# Patient Record
Sex: FEMALE | Race: WHITE | HISPANIC OR LATINO | Employment: STUDENT | URBAN - METROPOLITAN AREA
[De-identification: names, ages, dates, MRNs, and addresses within clinical notes are randomized per-mention and may not be internally consistent; named-entity substitution may affect disease eponyms.]

---
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
## 2023-01-01 NOTE — LETTER
2022    To Gail Kurtz YOB: 2005      This letter is to advise you that your recent CULTURE for gonorrhea and chlamydia results were reviewed by me and are NORMAL  Please contact the office for an appointment if you have any additional concerns      YASMANI Moon Statement Selected

## 2023-01-30 ENCOUNTER — TELEPHONE (OUTPATIENT)
Dept: OBGYN CLINIC | Facility: CLINIC | Age: 18
End: 2023-01-30

## 2023-01-30 NOTE — TELEPHONE ENCOUNTER
Patient called in reporting yellowish vaginal discharge and malodorous urine  I advised her to make an appointment to come in for evaluation

## 2023-02-11 ENCOUNTER — OFFICE VISIT (OUTPATIENT)
Dept: OBGYN CLINIC | Facility: CLINIC | Age: 18
End: 2023-02-11

## 2023-02-11 VITALS
DIASTOLIC BLOOD PRESSURE: 62 MMHG | WEIGHT: 145.2 LBS | HEIGHT: 64 IN | BODY MASS INDEX: 24.79 KG/M2 | HEART RATE: 91 BPM | SYSTOLIC BLOOD PRESSURE: 115 MMHG

## 2023-02-11 DIAGNOSIS — N89.8 VAGINAL DISCHARGE: Primary | ICD-10-CM

## 2023-02-11 DIAGNOSIS — N89.8 VAGINAL ODOR: ICD-10-CM

## 2023-02-11 DIAGNOSIS — B96.89 BV (BACTERIAL VAGINOSIS): ICD-10-CM

## 2023-02-11 DIAGNOSIS — N76.0 BV (BACTERIAL VAGINOSIS): ICD-10-CM

## 2023-02-11 DIAGNOSIS — Z13.31 POSITIVE DEPRESSION SCREENING: ICD-10-CM

## 2023-02-11 RX ORDER — METRONIDAZOLE 500 MG/1
500 TABLET ORAL EVERY 12 HOURS SCHEDULED
Qty: 14 TABLET | Refills: 0 | Status: SHIPPED | OUTPATIENT
Start: 2023-02-11 | End: 2023-02-18

## 2023-02-11 NOTE — LETTER
2023    To Quan Fisher  : 2005      This letter is to advise you that your recent CULTURES for gonorrhea and chlamydia were reviewed by me and are NORMAL  Please contact the office for an appointment if you have any additional concerns      [id number]

## 2023-02-11 NOTE — PATIENT INSTRUCTIONS
Thank you for your confidence in our team    We appreciate you and welcome your feedback  If you receive a survey from us, please take a few moments to let us know how we are doing     Sincerely,  Elaine Alu

## 2023-02-11 NOTE — PROGRESS NOTES
PROBLEM GYNECOLOGICAL VISIT    Jina Delgado is a 21 y o  female who presents today with complaint of vaginal discharge  Her general medical history has been reviewed and she reports it as follows:    Past Medical History:   Diagnosis Date   • Depression     no meds since 2021     Past Surgical History:   Procedure Laterality Date   • NO PAST SURGERIES       OB History        0    Para   0    Term   0       0    AB   0    Living   0       SAB   0    IAB   0    Ectopic   0    Multiple   0    Live Births   0               Social History     Tobacco Use   • Smoking status: Never   • Smokeless tobacco: Never   Vaping Use   • Vaping Use: Never used   Substance Use Topics   • Alcohol use: Never   • Drug use: Never     Social History     Substance and Sexual Activity   Sexual Activity Yes   • Partners: Male   • Birth control/protection: Condom Male, Patch       Current Outpatient Medications   Medication Instructions   • norelgestromin-ethinyl estradiol (ORTHO EVRA) 150-35 MCG/24HR 1 patch, Transdermal, Weekly       History of Present Illness:   Reports for the past 2 weeks she has been experiencing yellowish/whitish vaginal discharge and odor  Denies vaginal itching/irritation or pelvic pain  She is sexually active and does not use condoms  Review of Systems:  Review of Systems   Constitutional: Negative  Gastrointestinal: Negative  Genitourinary: Positive for vaginal discharge  Negative for dysuria, menstrual problem, pelvic pain and vaginal pain  Vaginal odor       Physical Exam:  /62 (BP Location: Right arm, Patient Position: Sitting, Cuff Size: Adult)   Pulse 91   Ht 5' 4" (1 626 m)   Wt 65 9 kg (145 lb 3 2 oz)   LMP 2023 (Exact Date)   BMI 24 92 kg/m²   Physical Exam  Constitutional:       General: She is not in acute distress  Genitourinary:      Vulva exam comments: Normal       Vaginal discharge present  No vaginal erythema     Neurological:      Mental Status: She is alert  Skin:     General: Skin is warm and dry  Vitals reviewed  Point of Care Testing:   -Wet mount: + clue cells, no trichomonads, few WBC's, pH=4 0   -KOH mount: no hyphae   -Whiff: negative    Assessment:   1  BV  Plan:   1  Given Rx Flagyl  2  GC/CT cultures ordered and collected  3  Return to office prn    4  Patient's depression screening was assessed with a PHQ-2 score of 3  Their PHQ-9 score was 10  Patient assessed for underlying major depression  They have no active suicidal ideations  Brief counseling provided and recommend additional follow-up/re-evaluation next office visit  Referral placed for  consultation

## 2023-02-13 LAB
C TRACH DNA SPEC QL NAA+PROBE: NEGATIVE
N GONORRHOEA DNA SPEC QL NAA+PROBE: NEGATIVE

## 2023-03-06 ENCOUNTER — OFFICE VISIT (OUTPATIENT)
Dept: OBGYN CLINIC | Facility: CLINIC | Age: 18
End: 2023-03-06

## 2023-03-06 VITALS
HEART RATE: 92 BPM | DIASTOLIC BLOOD PRESSURE: 70 MMHG | BODY MASS INDEX: 25.16 KG/M2 | WEIGHT: 146.6 LBS | SYSTOLIC BLOOD PRESSURE: 116 MMHG

## 2023-03-06 DIAGNOSIS — B37.31 VAGINAL CANDIDIASIS: Primary | ICD-10-CM

## 2023-03-06 DIAGNOSIS — N89.8 VAGINA ITCHING: ICD-10-CM

## 2023-03-06 DIAGNOSIS — N89.8 VAGINAL DISCHARGE: ICD-10-CM

## 2023-03-06 RX ORDER — FLUCONAZOLE 150 MG/1
150 TABLET ORAL
Qty: 2 TABLET | Refills: 0 | Status: SHIPPED | OUTPATIENT
Start: 2023-03-06 | End: 2023-03-10

## 2023-03-06 NOTE — PATIENT INSTRUCTIONS
Thank you for your confidence in our team    We appreciate you and welcome your feedback  If you receive a survey from us, please take a few moments to let us know how we are doing     Sincerely,  Ciera Leahy

## 2023-03-06 NOTE — PROGRESS NOTES
PROBLEM GYNECOLOGICAL VISIT    Elías Anderson is a 21 y o  female who presents today with complaint of vaginal discharge and itching/burning  Her general medical history has been reviewed and she reports it as follows:    Past Medical History:   Diagnosis Date   • Depression     no meds since 2021     Past Surgical History:   Procedure Laterality Date   • NO PAST SURGERIES       OB History        0    Para   0    Term   0       0    AB   0    Living   0       SAB   0    IAB   0    Ectopic   0    Multiple   0    Live Births   0               Social History     Tobacco Use   • Smoking status: Never   • Smokeless tobacco: Never   Vaping Use   • Vaping Use: Never used   Substance Use Topics   • Alcohol use: Never   • Drug use: Never     Social History     Substance and Sexual Activity   Sexual Activity Yes   • Partners: Male   • Birth control/protection: Condom Male, Patch       Current Outpatient Medications   Medication Instructions   • norelgestromin-ethinyl estradiol (ORTHO EVRA) 150-35 MCG/24HR 1 patch, Transdermal, Weekly       History of Present Illness:   Patient was seen here on 2023 for c/o vaginal discharge and odor  She was diagnosed with BV and GC/CT screening was negative  She reports that she completed full course of Flagyl as prescribed  However, she states that since then she has been experiencing thicker white vaginal discharge and itching/burning of vagina and vulva with open "scratches"  Review of Systems:  Review of Systems   Constitutional: Negative  Gastrointestinal: Negative  Genitourinary: Positive for vaginal discharge and vaginal pain  Physical Exam:  /70   Pulse 92   Wt 66 5 kg (146 lb 9 6 oz)   LMP 2023 (Exact Date)   BMI 25 16 kg/m²   Physical Exam  Constitutional:       General: She is not in acute distress  Genitourinary:      Vulva exam comments: + erythema and excoriation of bilateral labia minora and majora        Vaginal discharge and erythema present  Abdominal:      Palpations: Abdomen is soft  Tenderness: There is no abdominal tenderness  Neurological:      Mental Status: She is alert  Skin:     General: Skin is warm and dry  Vitals reviewed  Point of Care Testing:   -Wet mount: no clue cells, no trichomonads, few WBC's, pH=4 0   -KOH mount: + hyphae   -Whiff: negative    Assessment:   1  Vaginal candidiasis  Plan:   1  Given Rx Diflucan x2 doses  2  Return to office prn    3  Patient's depression screening was assessed with a PHQ-2 score of 2  Their PHQ-9 score was 8  Clinically patient does not have depression  No treatment is required  Reviewed with patient that test results are available in MyChart immediately, but that they will not necessarily be reviewed by me immediately  Explained that I will review results at my earliest opportunity and contact patient appropriately

## 2023-03-07 ENCOUNTER — PATIENT OUTREACH (OUTPATIENT)
Dept: OBGYN CLINIC | Facility: CLINIC | Age: 18
End: 2023-03-07

## 2023-03-09 NOTE — PROGRESS NOTES
PRO MORENO received a referral last month for a positive depression screen, PRO MORENO called pt today to see if she needed any services, pt requested I call back around 3:30, PRO JOSH called pt back, she reports that she was seeing a therapist off and on with [facility] since she was 10years old, however, her therapist is leaving the agency  She provided the patient with a 12 page list of other providers she could call to set up services which is overwhelming for the patient, she is also sad about having to start therapy all over again with someone new because she and her therapist had a great relationship  Despite feeling sad over this she does recognize how how helpful her therapist has been over the years and does think it would be best to start new services  PRO MORENO confirmed pts email address, sent her four agencies in the area that accept her insurance for care, she will look into them  PRO MORENO will close this referral at this time, please re-refer as needed

## 2023-03-11 ENCOUNTER — NURSE TRIAGE (OUTPATIENT)
Dept: OTHER | Facility: OTHER | Age: 18
End: 2023-03-11

## 2023-03-11 NOTE — TELEPHONE ENCOUNTER
Regarding: medication issue  ----- Message from Jacob Soler sent at 3/11/2023  1:50 PM EST -----  The medication that my doctor prescribed me for my bacterial vaginosis isn't helping with my situation at all and I have to go back to school on Monday so I wanted to know if there is something else that I can take "

## 2023-03-11 NOTE — TELEPHONE ENCOUNTER
Patient states she was seen in office for yeast infection and given 2 doses of Diflucan to take 3 days apart  Patient took medication and is still having yellow discharge and itching/burning/irritation  Patient states she still has excoriated areas with mild bleeding  How should i advise? Per provider advised to please schedule a follow up exam     Reason for Disposition  • Abnormal color vaginal discharge (i e , yellow, green, gray)    Answer Assessment - Initial Assessment Questions  1  DISCHARGE: "Describe the discharge " (e g , white, yellow, green, gray, foamy, cottage cheese-like)      White/ yellow  2  ODOR: "Is there a bad odor?"      Yes   3  ONSET: "When did the discharge begin?"      A week  4  RASH: "Is there a rash in that area?" If Yes, ask: "Describe it " (e g , redness, blisters, sores, bumps)      Red, excoriated, open areas mild bleeding  5  ABDOMINAL PAIN: "Are you having any abdominal pain?" If Yes, ask: "What does it feel like? " (e g , crampy, dull, intermittent, constant)       Denies  6  ABDOMINAL PAIN SEVERITY: If present, ask: "How bad is it?"  (e g , mild, moderate, severe)   - MILD - doesn't interfere with normal activities    - MODERATE - interferes with normal activities or awakens from sleep    - SEVERE - patient doesn't want to move (R/O peritonitis)       Denies  7  CAUSE: "What do you think is causing the discharge?" "Have you had the same problem before? What happened then?"      Unknown  8  OTHER SYMPTOMS: "Do you have any other symptoms?" (e g , fever, itching, vaginal bleeding, pain with urination, injury to genital area, vaginal foreign body)      Itching  9   PREGNANCY: "Is there any chance you are pregnant?" "When was your last menstrual period?"      Denies    Protocols used: VAGINAL DISCHARGE-ADULT-

## 2023-03-25 ENCOUNTER — OFFICE VISIT (OUTPATIENT)
Dept: OBGYN CLINIC | Facility: CLINIC | Age: 18
End: 2023-03-25

## 2023-03-25 VITALS
HEIGHT: 64 IN | WEIGHT: 148.4 LBS | BODY MASS INDEX: 25.33 KG/M2 | DIASTOLIC BLOOD PRESSURE: 73 MMHG | SYSTOLIC BLOOD PRESSURE: 123 MMHG | HEART RATE: 113 BPM

## 2023-03-25 DIAGNOSIS — N76.0 RECURRENT VAGINITIS: ICD-10-CM

## 2023-03-25 DIAGNOSIS — N89.8 VAGINAL DISCHARGE: Primary | ICD-10-CM

## 2023-03-25 RX ORDER — FLUCONAZOLE 150 MG/1
150 TABLET ORAL ONCE
Qty: 1 TABLET | Refills: 0 | Status: SHIPPED | OUTPATIENT
Start: 2023-03-25 | End: 2023-03-25

## 2023-03-25 RX ORDER — METRONIDAZOLE 500 MG/1
500 TABLET ORAL EVERY 12 HOURS SCHEDULED
Qty: 14 TABLET | Refills: 0 | Status: SHIPPED | OUTPATIENT
Start: 2023-03-25 | End: 2023-04-01

## 2023-03-25 NOTE — PROGRESS NOTES
PROBLEM GYNECOLOGICAL VISIT    Jesus Kee is a 21 y o  female who presents today with complaint of vaginal discharge  Her general medical history has been reviewed and she reports it as follows:    Past Medical History:   Diagnosis Date   • Depression     no meds since 2021     Past Surgical History:   Procedure Laterality Date   • NO PAST SURGERIES       OB History        0    Para   0    Term   0       0    AB   0    Living   0       SAB   0    IAB   0    Ectopic   0    Multiple   0    Live Births   0               Social History     Tobacco Use   • Smoking status: Never   • Smokeless tobacco: Never   Vaping Use   • Vaping Use: Never used   Substance Use Topics   • Alcohol use: Never   • Drug use: Never     Social History     Substance and Sexual Activity   Sexual Activity Yes   • Partners: Male   • Birth control/protection: Condom Male, Patch       Current Outpatient Medications   Medication Instructions   • norelgestromin-ethinyl estradiol (ORTHO EVRA) 150-35 MCG/24HR 1 patch, Transdermal, Weekly       History of Present Illness:   Patient was treated for BV in 2023 and for vaginal candidiasis earlier this month  She reports that the itching/burning has resolved, but that vaginal discharge persists  Denies vaginal odor  Review of Systems:  Review of Systems   Constitutional: Negative  Gastrointestinal: Negative  Genitourinary: Positive for vaginal discharge  Physical Exam:  /73   Pulse (!) 113   Ht 5' 4" (1 626 m)   Wt 67 3 kg (148 lb 6 4 oz)   LMP 2023   BMI 25 47 kg/m²   Physical Exam  Constitutional:       General: She is not in acute distress  Genitourinary:      Vulva exam comments: Normal       Vaginal discharge present  No vaginal erythema  Neurological:      Mental Status: She is alert  Skin:     General: Skin is warm and dry  Vitals reviewed         Point of Care Testing:   -Wet mount: no clue cells, no trichomonads, few WBC's, copious lactobacillus, pH=4 0   -KOH mount: no hyphae   -Whiff: negative    Assessment:   1  Recurrent vaginitis  Plan:   1  Given Rx Diflucan and flagyl for repeat therapy  2  Return to office as previously scheduled

## 2023-03-25 NOTE — PATIENT INSTRUCTIONS
Thank you for your confidence in our team    We appreciate you and welcome your feedback  If you receive a survey from us, please take a few moments to let us know how we are doing     Sincerely,  Sammy Tompkins

## 2023-03-28 ENCOUNTER — TELEPHONE (OUTPATIENT)
Dept: OBGYN CLINIC | Facility: CLINIC | Age: 18
End: 2023-03-28

## 2023-06-11 ENCOUNTER — NURSE TRIAGE (OUTPATIENT)
Dept: OTHER | Facility: OTHER | Age: 18
End: 2023-06-11

## 2023-06-11 NOTE — TELEPHONE ENCOUNTER
Patient notes she will be off her birth control patch for 2-3 days before she can  the refill  Patient was advised to use a back up form of birth control and call the office with any further questions  Patient verbalized understanding

## 2023-06-11 NOTE — TELEPHONE ENCOUNTER
"  Reason for Disposition  • Caller has medicine question only, adult not sick, AND triager answers question    Answer Assessment - Initial Assessment Questions  1  NAME of MEDICATION: \"What medicine are you calling about? \"      Ortho evra patch  2  QUESTION: Melodi Gowers is your question? \" (e g , medication refill, side effect)      I have been skipping my period so I will run out of patches before I can  my refill   Will I be okay    Protocols used: MEDICATION QUESTION CALL-ADULT-    "

## 2023-06-11 NOTE — TELEPHONE ENCOUNTER
"Regarding: Birth Control Question  ----- Message from Afua Bandar sent at 6/11/2023  4:36 PM EDT -----  \"I have a question about my birth control patch  I only have 1 left and I cannot get it refilled until two weeks  I was skipping my period with it and that's why I only have 1 left  Is it ok to go a few days without it? \"    "

## 2023-07-10 ENCOUNTER — HOSPITAL ENCOUNTER (EMERGENCY)
Facility: HOSPITAL | Age: 18
Discharge: HOME | End: 2023-07-10
Attending: EMERGENCY MEDICINE
Payer: COMMERCIAL

## 2023-07-10 ENCOUNTER — APPOINTMENT (EMERGENCY)
Dept: RADIOLOGY | Facility: HOSPITAL | Age: 18
End: 2023-07-10
Payer: COMMERCIAL

## 2023-07-10 VITALS
DIASTOLIC BLOOD PRESSURE: 80 MMHG | BODY MASS INDEX: 25.32 KG/M2 | RESPIRATION RATE: 20 BRPM | WEIGHT: 147.49 LBS | HEART RATE: 93 BPM | TEMPERATURE: 97.9 F | SYSTOLIC BLOOD PRESSURE: 128 MMHG | OXYGEN SATURATION: 99 %

## 2023-07-10 DIAGNOSIS — R05.3 CHRONIC COUGH: Primary | ICD-10-CM

## 2023-07-10 PROCEDURE — 71045 X-RAY EXAM CHEST 1 VIEW: CPT

## 2023-07-10 RX ORDER — BENZONATATE 100 MG/1
100 CAPSULE ORAL 3 TIMES DAILY PRN
Qty: 20 CAPSULE | Refills: 0 | Status: SHIPPED | OUTPATIENT
Start: 2023-07-10

## 2023-07-10 RX ORDER — LORATADINE 10 MG/1
10 TABLET ORAL DAILY
Qty: 20 TABLET | Refills: 0 | Status: SHIPPED | OUTPATIENT
Start: 2023-07-10

## 2023-07-10 NOTE — ED PROVIDER NOTES
History  Chief Complaint   Patient presents with   • Cough     PT STATES THAT SHE HAS HAD A COUGH FOR A MONTH AND IT HAS BEEN GETTING WORSE. 17yo previously healthy female presenting for cough. For 1 month Pt has had a cough that is occasionally productive. States it occurred around the time there was significant air pollution in her area. Did not have a preceding URI. Finds it occurs at random times, and not associated with a particular activity. Has tried cough drops, antihistamines without much relief. No h/o similar presentation. Denies F/C, SOB, CP, h/o asthma, wheezing, sick contacts. UTD with vaccinations. History provided by:  Patient      Prior to Admission Medications   Prescriptions Last Dose Informant Patient Reported? Taking?   norelgestromin-ethinyl estradiol (ORTHO EVRA) 150-35 MCG/24HR   No No   Sig: Place 1 patch on the skin once a week      Facility-Administered Medications: None       Past Medical History:   Diagnosis Date   • Depression     no meds since 9/2021       Past Surgical History:   Procedure Laterality Date   • NO PAST SURGERIES         Family History   Problem Relation Age of Onset   • Lupus Sister    • Breast cancer Neg Hx    • Colon cancer Neg Hx    • Ovarian cancer Neg Hx    • Cancer Neg Hx      I have reviewed and agree with the history as documented. E-Cigarette/Vaping   • E-Cigarette Use Never User      E-Cigarette/Vaping Substances     Social History     Tobacco Use   • Smoking status: Never   • Smokeless tobacco: Never   Vaping Use   • Vaping Use: Never used   Substance Use Topics   • Alcohol use: Never   • Drug use: Never        Review of Systems   Constitutional: Negative. HENT: Negative. Respiratory: Positive for cough. Negative for chest tightness, shortness of breath and wheezing. Cardiovascular: Negative. Gastrointestinal: Negative. Genitourinary: Negative. Musculoskeletal: Negative. Skin: Negative. Allergic/Immunologic: Negative. Neurological: Negative. Psychiatric/Behavioral: Negative. Physical Exam  ED Triage Vitals [07/10/23 1704]   Temperature Pulse Respirations Blood Pressure SpO2   97.9 °F (36.6 °C) 93 20 128/80 99 %      Temp Source Heart Rate Source Patient Position - Orthostatic VS BP Location FiO2 (%)   Tympanic Monitor Sitting Left arm --      Pain Score       --             Orthostatic Vital Signs  Vitals:    07/10/23 1704   BP: 128/80   Pulse: 93   Patient Position - Orthostatic VS: Sitting       Physical Exam  Constitutional:       General: She is not in acute distress. Appearance: Normal appearance. HENT:      Head: Normocephalic and atraumatic. Right Ear: External ear normal.      Left Ear: External ear normal.      Nose: Nose normal. No rhinorrhea. Mouth/Throat:      Mouth: Mucous membranes are moist.      Pharynx: Oropharynx is clear. Eyes:      Extraocular Movements: Extraocular movements intact. Conjunctiva/sclera: Conjunctivae normal.   Cardiovascular:      Rate and Rhythm: Normal rate and regular rhythm. Pulses: Normal pulses. Heart sounds: Normal heart sounds. Pulmonary:      Effort: Pulmonary effort is normal.      Breath sounds: Normal breath sounds. Abdominal:      General: Abdomen is flat. Palpations: Abdomen is soft. Skin:     General: Skin is warm and dry. Capillary Refill: Capillary refill takes less than 2 seconds. Neurological:      General: No focal deficit present. Mental Status: She is alert and oriented to person, place, and time. ED Medications  Medications - No data to display    Diagnostic Studies  Results Reviewed     None                 XR chest 1 view portable   ED Interpretation by Ovidio Weinberg MD (07/10 1754)   No evidence of acute cardiopulmonary pathology. Procedures  Procedures      ED Course         MAY    Flowsheet Row Most Recent Value   MAY Initial Screen: During the past 12 months, did you:    1. Drink any alcohol (more than a few sips)? No Filed at: 07/10/2023 1704   2. Smoke any marijuana or hashish No Filed at: 07/10/2023 1704   3. Use anything else to get high? ("anything else" includes illegal drugs, over the counter and prescription drugs, and things that you sniff or 'mann')? No Filed at: 07/10/2023 1704                                    Medical Decision Making  CXR negative for PNA. Pt likely with bronchitis/allergy. Will treat with pharmacotherapy and have Pt f/u with PCP. Amount and/or Complexity of Data Reviewed  Radiology: ordered and independent interpretation performed. Risk  OTC drugs. Prescription drug management. Disposition  Final diagnoses:   Chronic cough     Time reflects when diagnosis was documented in both MDM as applicable and the Disposition within this note     Time User Action Codes Description Comment    7/10/2023  5:18 PM Park Ca Add [R05.3] Chronic cough       ED Disposition     ED Disposition   Discharge    Condition   Stable    Date/Time   Mon Jul 10, 2023  5:55 PM    [address] discharge to home/self care.                Follow-up Information     Follow up With Specialties Details Why Contact Info Additional [address] [facility] Emergency Medicine Go to  If symptoms worsen [address] [phone] [address] [facility] Emergency Department    Maylin Almeida PA-C Family Medicine Schedule an appointment as soon as possible for a visit  As needed [address] [facility]  [address]             Patient's Medications   Discharge Prescriptions    BENZONATATE (TESSALON PERLES) 100 MG CAPSULE    Take 1 capsule (100 mg total) by mouth 3 (three) times a day as needed for cough       Start Date: 7/10/2023 End Date: --       Order Dose: 100 mg       Quantity: 20 capsule    Refills: 0    LORATADINE (CLARITIN) 10 MG TABLET Take 1 tablet (10 mg total) by mouth daily       Start Date: 7/10/2023 End Date: --       Order Dose: 10 mg       Quantity: 20 tablet    Refills: 0     No discharge procedures on file. PDMP Review     None           ED Provider  Attending physically available and evaluated [facility]. I managed the patient along with the ED Attending.     Electronically Signed by         Fannie Jacobson MD  07/10/23 8394

## 2023-07-10 NOTE — Clinical Note
Malathi Tapia was seen and treated in our emergency department on 7/10/2023. Diagnosis:     Jesus Aguayo  . She may return on this date: May return to school/work on 7/11. If you have any questions or concerns, please don't hesitate to call.       Park Ca MD    ______________________________           _______________          _______________  Hospital Representative                              Date                                Time

## 2023-07-12 NOTE — PROGRESS NOTES
Carson Pham presents today for patch check  She was started on transdermal contraceptive patch 3 months ago  Overall she reports that she is very happy with this method and desires to continue with it  /73   Pulse 101   Ht 5' 3" (1 6 m)   Wt 65 8 kg (145 lb)   LMP 02/16/2022   BMI 25 69 kg/m²     Given Rx refills for another year  Return in 1 year for patch check again 
yes

## 2023-07-31 ENCOUNTER — OFFICE VISIT (OUTPATIENT)
Dept: FAMILY MEDICINE CLINIC | Facility: CLINIC | Age: 18
End: 2023-07-31

## 2023-07-31 VITALS
RESPIRATION RATE: 18 BRPM | SYSTOLIC BLOOD PRESSURE: 116 MMHG | TEMPERATURE: 97.8 F | WEIGHT: 149.2 LBS | DIASTOLIC BLOOD PRESSURE: 62 MMHG | BODY MASS INDEX: 25.47 KG/M2 | OXYGEN SATURATION: 99 % | HEIGHT: 64 IN | HEART RATE: 82 BPM

## 2023-07-31 DIAGNOSIS — E66.3 OVERWEIGHT: ICD-10-CM

## 2023-07-31 DIAGNOSIS — Z00.00 ANNUAL PHYSICAL EXAM: Primary | ICD-10-CM

## 2023-07-31 DIAGNOSIS — R05.3 CHRONIC COUGH: ICD-10-CM

## 2023-07-31 PROCEDURE — 99395 PREV VISIT EST AGE 18-39: CPT | Performed by: PHYSICIAN ASSISTANT

## 2023-07-31 RX ORDER — BENZONATATE 100 MG/1
100 CAPSULE ORAL 3 TIMES DAILY PRN
Qty: 30 CAPSULE | Refills: 0 | Status: SHIPPED | OUTPATIENT
Start: 2023-07-31

## 2023-07-31 RX ORDER — LORATADINE 10 MG/1
10 TABLET ORAL DAILY
Qty: 30 TABLET | Refills: 1 | Status: SHIPPED | OUTPATIENT
Start: 2023-07-31

## 2023-07-31 NOTE — ASSESSMENT & PLAN NOTE
- Present for the past 2 months. Initially associated with congestion, but that has now resolved. Reviewed recent ED visit. Patient reports moderate improvement since taking Claritin and Tessalon Perles. - Reviewed chest xray (7/10/23) which was normal.  - Will prescribe Mucinex DM, twice daily as needed. - Recommend schedule follow-up visit if cough persists/worsens.

## 2023-07-31 NOTE — PROGRESS NOTES
[address] [facility] [place]    NAME: Ana Ferguson  AGE: 21 y.o. SEX: female  : 2005     DATE: 2023     Assessment and Plan:     Problem List Items Addressed This Visit        Other    Chronic cough     - Present for the past 2 months. Initially associated with congestion, but that has now resolved. Reviewed recent ED visit. Patient reports moderate improvement since taking Claritin and Tessalon Perles. - Reviewed chest xray (7/10/23) which was normal.  - Will prescribe Mucinex DM, twice daily as needed. - Recommend schedule follow-up visit if cough persists/worsens. Relevant Medications    dextromethorphan-guaifenesin (MUCINEX DM)  MG per 12 hr tablet    loratadine (CLARITIN) 10 mg tablet    benzonatate (TESSALON PERLES) 100 mg capsule   Other Visit Diagnoses     Annual physical exam    -  Primary    Overweight            I have recommended that this patient have a tetanus booster but she declines at this time. I have discussed the risks and benefits of this examination with her. The patient verbalizes understanding. Immunizations and preventive care screenings were discussed with patient today. Appropriate education was printed on patient's after visit summary. Counseling:  Alcohol/drug use: discussed moderation in alcohol intake, the recommendations for healthy alcohol use, and avoidance of illicit drug use. Dental Health: discussed importance of regular tooth brushing, flossing, and dental visits. Injury prevention: discussed safety/seat belts, safety helmets, smoke detectors, carbon dioxide detectors, and smoking near bedding or upholstery. Sexual health: discussed sexually transmitted diseases, partner selection, use of condoms, avoidance of unintended pregnancy, and contraceptive alternatives. Exercise: the importance of regular exercise/physical activity was discussed.  Recommend exercise 3-5 times per week for at least 30 minutes. BMI Counseling: Body mass index is 25.61 kg/m². The BMI is above normal. Nutrition recommendations include decreasing portion sizes, encouraging healthy choices of fruits and vegetables, consuming healthier snacks, limiting drinks that contain sugar, moderation in carbohydrate intake, increasing intake of lean protein and reducing intake of cholesterol. Exercise recommendations include moderate physical activity 150 minutes/week. No pharmacotherapy was ordered. Rationale for BMI follow-up plan is due to patient being overweight or obese. Return in about 1 year (around 7/31/2024) for Annual physical.     Chief Complaint:     Chief Complaint   Patient presents with   • Physical Exam      History of Present Illness:     Adult Annual Physical   Patient here for a comprehensive physical exam.  Patient notes she has had productive cough for the past 3 months. Patient notes initially she did have runny nose with thick, but that has now resolved. Patient notes associated yellowish/greenish phlegm. She did go to the ED and was prescribed Claritin and Tessalon Perles. Patient notes they did help somewhat.    -Patient notes she will be entering her second year at [employer] this fall. Patient notes she may take the semester off. She is currently studying criminal justice. For the summer, she has been working at [employer] as a . Diet and Physical Activity  Diet/Nutrition: well balanced diet. Exercise: no formal exercise. General Health  Sleep: sleeps well. Hearing: normal - bilateral.  Vision: goes for regular eye exams and wears glasses. Dental: regular dental visits. /GYN Health  Last menstrual period: 7/19/23  Contraceptive method: Ortho Evra Patch     Review of Systems:     Review of Systems   Constitutional: Negative for chills, fatigue and fever. HENT: Negative for congestion, ear pain, rhinorrhea and sore throat. Eyes: Negative for pain and visual disturbance. Respiratory: Positive for cough. Negative for chest tightness and shortness of breath. Cardiovascular: Negative for chest pain, palpitations and leg swelling. Gastrointestinal: Negative for abdominal pain, constipation, diarrhea, nausea and vomiting. Genitourinary: Negative for difficulty urinating and dysuria. Musculoskeletal: Negative for arthralgias and back pain. Skin: Negative for rash. Neurological: Negative for dizziness and headaches. Psychiatric/Behavioral: The patient is not nervous/anxious. Past Medical History:     Past Medical History:   Diagnosis Date   • Depression     no meds since 9/2021      Past Surgical History:     Past Surgical History:   Procedure Laterality Date   • NO PAST SURGERIES        Social History:     Social History     Socioeconomic History   • Marital status: Single     Spouse name: None   • Number of children: None   • Years of education: None   • Highest education level: None   Occupational History   • None   Tobacco Use   • Smoking status: Never   • Smokeless tobacco: Never   Vaping Use   • Vaping Use: Never used   Substance and Sexual Activity   • Alcohol use: Never   • Drug use: Never   • Sexual activity: Yes     Partners: Male     Birth control/protection: Condom Male, Patch   Other Topics Concern   • None   Social History Narrative   • None     Social Determinants of Health     Financial Resource Strain: Low Risk  (3/25/2023)    Overall Financial Resource Strain (CARDIA)    • Difficulty of Paying Living Expenses: Not hard at all   Food Insecurity: No Food Insecurity (3/25/2023)    Hunger Vital Sign    • Worried About Running Out of Food in the Last Year: Never true    • Ran Out of Food in the Last Year: Never true   Transportation Needs: No Transportation Needs (3/25/2023)    PRAPARE - Transportation    • Lack of Transportation (Medical): No    • Lack of Transportation (Non-Medical):  No   Physical Activity: Not on file   Stress: Not on file   Social Connections: Not on file   Intimate Partner Violence: Not on file   Housing Stability: Low Risk  (4/1/2022)    Housing Stability Vital Sign    • Unable to Pay for Housing in the Last Year: No    • Number of Places Lived in the Last Year: 1    • Unstable Housing in the Last Year: No       Social Determinants of Health     Tobacco Use: Low Risk  (7/31/2023)    Patient History    • Smoking Tobacco Use: Never    • Smokeless Tobacco Use: Never    • Passive Exposure: Not on file   Alcohol Use: Not At Risk (4/1/2022)    AUDIT-C    • Frequency of Alcohol Consumption: Never    • Average Number of Drinks: Patient refused    • Frequency of Binge Drinking: Never   Financial Resource Strain: Low Risk  (3/25/2023)    Overall Financial Resource Strain (CARDIA)    • Difficulty of Paying Living Expenses: Not hard at all   Food Insecurity: No Food Insecurity (3/25/2023)    Hunger Vital Sign    • Worried About Running Out of Food in the Last Year: Never true    • Ran Out of Food in the Last Year: Never true   Transportation Needs: No Transportation Needs (3/25/2023)    PRAPARE - Transportation    • Lack of Transportation (Medical): No    • Lack of Transportation (Non-Medical):  No   Physical Activity: Not on file   Stress: Not on file   Social Connections: Not on file   Intimate Partner Violence: Not on file   Depression: Not at risk (3/25/2023)    PHQ-2    • PHQ-2 Score: 0   Recent Concern: Depression - At risk (2/11/2023)    PHQ-2    • PHQ-2 Score: 3   Housing Stability: Low Risk  (4/1/2022)    Housing Stability Vital Sign    • Unable to Pay for Housing in the Last Year: No    • Number of Places Lived in the Last Year: 1    • Unstable Housing in the Last Year: No        Family History:     Family History   Problem Relation Age of Onset   • Lupus Sister    • Breast cancer Neg Hx    • Colon cancer Neg Hx    • Ovarian cancer Neg Hx    • Cancer Neg Hx       Current Medications: Current Outpatient Medications   Medication Sig Dispense Refill   • benzonatate (TESSALON PERLES) 100 mg capsule Take 1 capsule (100 mg total) by mouth 3 (three) times a day as needed for cough 30 capsule 0   • dextromethorphan-guaifenesin (MUCINEX DM)  MG per 12 hr tablet Take 1 tablet by mouth every 12 (twelve) hours 30 tablet 0   • loratadine (CLARITIN) 10 mg tablet Take 1 tablet (10 mg total) by mouth daily 30 tablet 1   • norelgestromin-ethinyl estradiol (ORTHO EVRA) 150-35 MCG/24HR Place 1 patch on the skin once a week 9 patch 4     No current facility-administered medications for this visit. Allergies:     No Known Allergies   Physical Exam:     /62 (BP Location: Left arm, Patient Position: Sitting, Cuff Size: Standard)   Pulse 82   Temp 97.8 °F (36.6 °C) (Temporal)   Resp 18   Ht 5' 4" (1.626 m)   Wt 67.7 kg (149 lb 3.2 oz)   LMP 07/19/2023 (Exact Date)   SpO2 99%   BMI 25.61 kg/m²     Physical Exam  Vitals and nursing note reviewed. Constitutional:       General: She is not in acute distress. Appearance: She is well-developed. HENT:      Head: Normocephalic and atraumatic. Right Ear: External ear normal.      Left Ear: External ear normal.      Nose: Nose normal.   Eyes:      Conjunctiva/sclera: Conjunctivae normal.   Cardiovascular:      Rate and Rhythm: Normal rate and regular rhythm. Pulses: Normal pulses. Heart sounds: Normal heart sounds. Pulmonary:      Effort: Pulmonary effort is normal. No respiratory distress. Breath sounds: Normal breath sounds. No wheezing. Abdominal:      General: Bowel sounds are normal.      Palpations: Abdomen is soft. Tenderness: There is no abdominal tenderness. Musculoskeletal:         General: Normal range of motion. Cervical back: Normal range of motion and neck supple. Skin:     General: Skin is warm and dry.    Neurological:      Mental Status: She is alert and oriented to person, place, and time.   Psychiatric:         Behavior: Behavior normal.         Danielle Monson PA-C   [address] [facility]

## 2023-08-10 ENCOUNTER — TELEPHONE (OUTPATIENT)
Dept: OBGYN CLINIC | Facility: CLINIC | Age: 18
End: 2023-08-10

## 2023-09-10 DIAGNOSIS — Z30.09 UNWANTED FERTILITY: ICD-10-CM

## 2023-09-11 RX ORDER — NORELGESTROMIN AND ETHINYL ESTRADIOL 150; 35 UG/D; UG/D
PATCH TRANSDERMAL
Qty: 9 PATCH | Refills: 4 | Status: SHIPPED | OUTPATIENT
Start: 2023-09-11

## 2024-01-15 ENCOUNTER — TELEPHONE (OUTPATIENT)
Dept: OBGYN CLINIC | Facility: CLINIC | Age: 19
End: 2024-01-15

## 2024-02-23 ENCOUNTER — APPOINTMENT (OUTPATIENT)
Dept: URGENT CARE | Age: 19
End: 2024-02-23

## 2024-03-12 ENCOUNTER — OFFICE VISIT (OUTPATIENT)
Dept: OBGYN CLINIC | Facility: CLINIC | Age: 19
End: 2024-03-12

## 2024-03-12 VITALS
DIASTOLIC BLOOD PRESSURE: 71 MMHG | BODY MASS INDEX: 26.43 KG/M2 | WEIGHT: 154 LBS | SYSTOLIC BLOOD PRESSURE: 107 MMHG | HEART RATE: 100 BPM

## 2024-03-12 DIAGNOSIS — Z13.31 POSITIVE DEPRESSION SCREENING: ICD-10-CM

## 2024-03-12 DIAGNOSIS — N89.8 VAGINAL DISCHARGE: Primary | ICD-10-CM

## 2024-03-12 PROBLEM — R05.3 CHRONIC COUGH: Status: RESOLVED | Noted: 2023-07-31 | Resolved: 2024-03-12

## 2024-03-12 LAB
BV WHIFF TEST VAG QL: NORMAL
CLUE CELLS SPEC QL WET PREP: NORMAL
PH SMN: 3.5 [PH]
SL AMB POCT WET MOUNT: NORMAL
T VAGINALIS VAG QL WET PREP: NORMAL
YEAST VAG QL WET PREP: NORMAL

## 2024-03-12 PROCEDURE — 87591 N.GONORRHOEAE DNA AMP PROB: CPT | Performed by: NURSE PRACTITIONER

## 2024-03-12 PROCEDURE — 99213 OFFICE O/P EST LOW 20 MIN: CPT | Performed by: NURSE PRACTITIONER

## 2024-03-12 PROCEDURE — 87210 SMEAR WET MOUNT SALINE/INK: CPT | Performed by: NURSE PRACTITIONER

## 2024-03-12 PROCEDURE — 87491 CHLMYD TRACH DNA AMP PROBE: CPT | Performed by: NURSE PRACTITIONER

## 2024-03-12 NOTE — PROGRESS NOTES
PROBLEM GYNECOLOGICAL VISIT    Sindhu Ramirez is a 18 y.o. female who presents today with complaint of vaginal discharge.  Her general medical history has been reviewed and she reports it as follows:    Past Medical History:   Diagnosis Date    Depression      Past Surgical History:   Procedure Laterality Date    NO PAST SURGERIES       OB History          0    Para   0    Term   0       0    AB   0    Living   0         SAB   0    IAB   0    Ectopic   0    Multiple   0    Live Births   0               Social History     Tobacco Use    Smoking status: Never    Smokeless tobacco: Never   Vaping Use    Vaping status: Never Used   Substance Use Topics    Alcohol use: Never    Drug use: Never     Social History     Substance and Sexual Activity   Sexual Activity Yes    Partners: Male    Birth control/protection: Condom Male, Patch       Current Outpatient Medications   Medication Instructions    Xulane 150-35 MCG/24HR APPLY 1 PATCH ONE TIME PER WEEK       History of Present Illness:   Reports that she seems to continuously have a thin yellowish vaginal discharge which she is not sure if it is normal or not.  Denies vaginal itching, irritation, or odor.  Denies pelvic pain.    Review of Systems:  Review of Systems   Constitutional: Negative.    Gastrointestinal: Negative.    Genitourinary:  Positive for vaginal discharge (thin, yellowish). Negative for pelvic pain and vaginal pain.       Physical Exam:  /71 (BP Location: Right arm, Patient Position: Sitting, Cuff Size: Standard)   Pulse 100   Wt 69.9 kg (154 lb)   LMP 2024 (Exact Date)   BMI 26.43 kg/m²   Physical Exam  Constitutional:       General: She is not in acute distress.  Genitourinary:      Vulva exam comments: Normal.      Vaginal discharge (small amount thin yellowish) present.      No vaginal erythema.   Neurological:      Mental Status: She is alert.   Skin:     General: Skin is warm and dry.   Vitals reviewed.       Point of  Care Testing:   -Wet mount: no clue cells, no trichomonads, few WBC's, pH=3.5   -KOH mount: no hyphae   -Whiff: negative    Assessment:   1. Normal vaginal discharge.    Plan:   1. Cultures ordered: GC/CT.   2. Return to office in 7 months for annual GYN exam and first pap smear.   3. Patient's depression screening was assessed with a PHQ-2 score of 2. Their PHQ-9 score was 12. Patient assessed for underlying major depression. They have no active suicidal ideations. Brief counseling provided and recommend additional follow-up/re-evaluation next office visit.  Referral placed for  consultation.    Reviewed with patient that test results are available in [facility] immediately, but that they will not necessarily be reviewed by me immediately.  Explained that I will review results at my earliest opportunity and contact patient appropriately.

## 2024-03-12 NOTE — LETTER
3/13/2024    To Sindhu Ramirez  : 2005      This letter is to advise you that your recent CULTURES for gonorrhea and chlamydia were reviewed by me and are NORMAL.  Please contact the office for an appointment if you have any additional concerns.    YASMANI Layne

## 2024-03-12 NOTE — PATIENT INSTRUCTIONS
Thank you for your confidence in our team.   We appreciate you and welcome your feedback.   If you receive a survey from us, please take a few moments to let us know how we are doing.   Sincerely,  YASMANI Layne

## 2024-03-13 LAB
C TRACH DNA SPEC QL NAA+PROBE: NEGATIVE
N GONORRHOEA DNA SPEC QL NAA+PROBE: NEGATIVE

## 2024-03-25 ENCOUNTER — PATIENT OUTREACH (OUTPATIENT)
Dept: OBGYN CLINIC | Facility: CLINIC | Age: 19
End: 2024-03-25

## 2024-03-25 NOTE — PROGRESS NOTES
PRO MORENO was referred by YASMANI Palmer for a depression screen of 12 at pts annual exam on 03/12. PRO MORENO reivewed pts chart, pt and PRO MORENO spoke last year about the same, pt has a hx of therapy through [facility], PRO MORENO sent a list of MH resources at that time.     PRO MORENO called pt today to check in and offer resources for MH services, there was no answer. PRO MORENO left a  for a return call.

## 2024-04-10 ENCOUNTER — PATIENT OUTREACH (OUTPATIENT)
Dept: OBGYN CLINIC | Facility: CLINIC | Age: 19
End: 2024-04-10

## 2024-04-10 NOTE — PROGRESS NOTES
PRO MORENO was able to get in touch with pt today in regards to her PHQ-9. Pt did not indicate any thoughts to harm herself on her PHQ-9. PRO MORENO called the pt, she reports she has not re-connected with a MH provider since we spoke last year but is interested again in receiving information for places she can call to connect with services. PRO MORENO sent information via find help.     POR MORENO will close this encounter at this time.

## 2024-07-11 ENCOUNTER — OFFICE VISIT (OUTPATIENT)
Dept: FAMILY MEDICINE CLINIC | Facility: CLINIC | Age: 19
End: 2024-07-11

## 2024-07-11 VITALS
DIASTOLIC BLOOD PRESSURE: 74 MMHG | TEMPERATURE: 97.8 F | HEIGHT: 62 IN | RESPIRATION RATE: 16 BRPM | BODY MASS INDEX: 27.6 KG/M2 | HEART RATE: 90 BPM | OXYGEN SATURATION: 98 % | WEIGHT: 150 LBS | SYSTOLIC BLOOD PRESSURE: 107 MMHG

## 2024-07-11 DIAGNOSIS — Z02.4 DRIVER'S PERMIT PE (PHYSICAL EXAMINATION): Primary | ICD-10-CM

## 2024-07-11 PROCEDURE — 99213 OFFICE O/P EST LOW 20 MIN: CPT | Performed by: FAMILY MEDICINE

## 2024-07-11 NOTE — ASSESSMENT & PLAN NOTE
At this time there are no medical conditions which affect ability to operate a vehicle.    Discussed importance of seat belt safety for  and all passengers in the car.   Do not use alcohol, drugs, or substances which inhibit your ability to operate a vehicle.    Do not use cell phone or other devices which can distract you while operating a vehicle.    Reviewed importance of familiarizing ones self with the rules and regulations outlined in drivers manual.    Continue to follow with your PCP for routine primary care and if any new conditions or limitations develop.

## 2024-07-11 NOTE — PROGRESS NOTES
Ambulatory Visit  Name: Sindhu Ramirez      : 2005      MRN: [id number]  Encounter Provider: Clover Horowitz MD  Encounter Date: 2024   Encounter department: [facility]    Assessment & Plan   1. 's permit PE (physical examination)  Assessment & Plan:  At this time there are no medical conditions which affect ability to operate a vehicle.    Discussed importance of seat belt safety for  and all passengers in the car.   Do not use alcohol, drugs, or substances which inhibit your ability to operate a vehicle.    Do not use cell phone or other devices which can distract you while operating a vehicle.    Reviewed importance of familiarizing ones self with the rules and regulations outlined in drivers manual.    Continue to follow with your PCP for routine primary care and if any new conditions or limitations develop.     History of Present Illness     HPI  Sindhu Ramirez is a 18 y.o. female  who presented to the office today for a DMV Permit Physical.  Pt does wear eyeglasses.  She does not have a h/o seizure disorder uncontrolled Htn/DM, h/o alcohol or drug abuse, or cardiac disorder that would impair her driving ability.      The following portions of the patient's history were reviewed and updated as appropriate: allergies, current medications, past family history, past medical history, past social history, past surgical history and problem list.    Review of Systems   Constitutional:  Negative for chills and fever.   HENT:  Negative for congestion, rhinorrhea and sore throat.    Respiratory:  Negative for cough and shortness of breath.    Cardiovascular:  Negative for chest pain.   Gastrointestinal:  Negative for diarrhea, nausea and vomiting.   Skin:  Negative for rash.   Neurological:  Negative for dizziness and headaches.       Objective     /74 (BP Location: Right arm, Patient Position: Sitting, Cuff Size: Standard)   Pulse 90   Temp 97.8 °F  "(36.6 °C) (Temporal)   Resp 16   Ht 5' 2.4\" (1.585 m)   Wt 68 kg (150 lb)   LMP 06/20/2024 (Exact Date)   SpO2 98%   BMI 27.08 kg/m²     Physical Exam  Vitals and nursing note reviewed.   Constitutional:       General: She is not in acute distress.     Appearance: She is well-developed.   HENT:      Head: Normocephalic and atraumatic.      Right Ear: Tympanic membrane, ear canal and external ear normal.      Left Ear: Tympanic membrane, ear canal and external ear normal.      Nose: Nose normal.      Mouth/Throat:      Mouth: Mucous membranes are moist.   Eyes:      Extraocular Movements: Extraocular movements intact.      Conjunctiva/sclera: Conjunctivae normal.      Pupils: Pupils are equal, round, and reactive to light.   Cardiovascular:      Rate and Rhythm: Normal rate and regular rhythm.      Heart sounds: No murmur heard.  Pulmonary:      Effort: Pulmonary effort is normal. No respiratory distress.      Breath sounds: Normal breath sounds.   Abdominal:      Palpations: Abdomen is soft.      Tenderness: There is no abdominal tenderness.   Musculoskeletal:         General: No swelling.      Cervical back: Normal range of motion and neck supple.      Right lower leg: No edema.      Left lower leg: No edema.   Skin:     General: Skin is warm and dry.      Capillary Refill: Capillary refill takes less than 2 seconds.   Neurological:      Mental Status: She is alert and oriented to person, place, and time.   Psychiatric:         Mood and Affect: Mood normal.         Behavior: Behavior normal.       Administrative Statements     "

## 2024-08-10 PROBLEM — Z02.4 DRIVER'S PERMIT PE (PHYSICAL EXAMINATION): Status: RESOLVED | Noted: 2024-07-11 | Resolved: 2024-08-10

## 2024-09-25 ENCOUNTER — APPOINTMENT (OUTPATIENT)
Dept: LAB | Facility: CLINIC | Age: 19
End: 2024-09-25
Payer: COMMERCIAL

## 2024-09-25 ENCOUNTER — ANNUAL EXAM (OUTPATIENT)
Dept: OBGYN CLINIC | Facility: CLINIC | Age: 19
End: 2024-09-25

## 2024-09-25 VITALS
HEIGHT: 62 IN | BODY MASS INDEX: 26.94 KG/M2 | HEART RATE: 86 BPM | DIASTOLIC BLOOD PRESSURE: 69 MMHG | WEIGHT: 146.4 LBS | SYSTOLIC BLOOD PRESSURE: 108 MMHG

## 2024-09-25 DIAGNOSIS — Z23 NEED FOR HPV VACCINATION: ICD-10-CM

## 2024-09-25 DIAGNOSIS — Z12.4 SCREENING FOR CERVICAL CANCER: ICD-10-CM

## 2024-09-25 DIAGNOSIS — Z01.419 ENCOUNTER FOR GYNECOLOGICAL EXAMINATION WITHOUT ABNORMAL FINDING: Primary | ICD-10-CM

## 2024-09-25 DIAGNOSIS — Z11.3 SCREEN FOR STD (SEXUALLY TRANSMITTED DISEASE): ICD-10-CM

## 2024-09-25 DIAGNOSIS — Z80.3 FAMILY HISTORY OF BREAST CANCER IN FIRST DEGREE RELATIVE: ICD-10-CM

## 2024-09-25 DIAGNOSIS — Z30.09 UNWANTED FERTILITY: ICD-10-CM

## 2024-09-25 DIAGNOSIS — Z12.39 ENCOUNTER FOR BREAST CANCER SCREENING USING NON-MAMMOGRAM MODALITY: ICD-10-CM

## 2024-09-25 DIAGNOSIS — B37.31 RECURRENT CANDIDIASIS OF VAGINA: ICD-10-CM

## 2024-09-25 LAB — TREPONEMA PALLIDUM IGG+IGM AB [PRESENCE] IN SERUM OR PLASMA BY IMMUNOASSAY: NORMAL

## 2024-09-25 PROCEDURE — 86780 TREPONEMA PALLIDUM: CPT

## 2024-09-25 PROCEDURE — 87340 HEPATITIS B SURFACE AG IA: CPT

## 2024-09-25 PROCEDURE — 36415 COLL VENOUS BLD VENIPUNCTURE: CPT

## 2024-09-25 PROCEDURE — 99395 PREV VISIT EST AGE 18-39: CPT | Performed by: NURSE PRACTITIONER

## 2024-09-25 PROCEDURE — G0145 SCR C/V CYTO,THINLAYER,RESCR: HCPCS | Performed by: NURSE PRACTITIONER

## 2024-09-25 PROCEDURE — 87591 N.GONORRHOEAE DNA AMP PROB: CPT | Performed by: NURSE PRACTITIONER

## 2024-09-25 PROCEDURE — 87491 CHLMYD TRACH DNA AMP PROBE: CPT | Performed by: NURSE PRACTITIONER

## 2024-09-25 PROCEDURE — 87389 HIV-1 AG W/HIV-1&-2 AB AG IA: CPT

## 2024-09-25 PROCEDURE — 86803 HEPATITIS C AB TEST: CPT

## 2024-09-25 PROCEDURE — 90471 IMMUNIZATION ADMIN: CPT | Performed by: NURSE PRACTITIONER

## 2024-09-25 PROCEDURE — 90651 9VHPV VACCINE 2/3 DOSE IM: CPT | Performed by: NURSE PRACTITIONER

## 2024-09-25 RX ORDER — FLUCONAZOLE 150 MG/1
150 TABLET ORAL WEEKLY
Qty: 12 TABLET | Refills: 0 | Status: SHIPPED | OUTPATIENT
Start: 2024-09-25 | End: 2024-12-12

## 2024-09-25 RX ORDER — NORELGESTROMIN AND ETHINYL ESTRADIOL 35; 150 UG/MG; UG/MG
1 PATCH TRANSDERMAL WEEKLY
Qty: 9 PATCH | Refills: 4 | Status: SHIPPED | OUTPATIENT
Start: 2024-09-25

## 2024-09-25 NOTE — LETTER
2024    To Sindhu Ramirez  : 2005      This letter is to advise you that your recent BLOODWORK for Sexually-Transmitted Diseases (HIV, hepatitis B, hepatitis C, and syphilis) results were reviewed by me and are NORMAL.  Please contact our office for an appointment if you have any additional concerns.    YASMANI Layne

## 2024-09-25 NOTE — LETTER
2024    To Sindhu Ramirez  : 2005      This letter is to advise you that your recent CULTURES for gonorrhea and chlamydia were reviewed by me and are NORMAL.  Please contact the office for an appointment if you have any additional concerns.    YASMANI Layne

## 2024-09-25 NOTE — PROGRESS NOTES
Patient given 1st hpv  on left deltoid on 9/25/24    NDC# 2812-2703-86  LOT# [id number]  EXP# [id number]

## 2024-09-25 NOTE — PROGRESS NOTES
"ANNUAL GYNECOLOGICAL EXAMINATION    Sindhu Ramirez is a 19 y.o. female who presents today for annual GYN exam.  She has never had a pap smear performed previously.  She had HIV screening performed 10/7/2020 and it was negative.  She reports menses as regular.  Patient's last menstrual period was 2024 (exact date).  Her general medical history has been reviewed and she reports it as follows:    Past Medical History:   Diagnosis Date    Depression      Past Surgical History:   Procedure Laterality Date    NO PAST SURGERIES       OB History          0    Para   0    Term   0       0    AB   0    Living   0         SAB   0    IAB   0    Ectopic   0    Multiple   0    Live Births   0               Social History     Tobacco Use    Smoking status: Never    Smokeless tobacco: Never   Vaping Use    Vaping status: Never Used   Substance Use Topics    Alcohol use: Never    Drug use: Never     Social History     Substance and Sexual Activity   Sexual Activity Yes    Partners: Male    Birth control/protection: Condom Male, Patch     Cancer-related family history includes Breast cancer (age of onset: 42) in her mother. There is no history of Colon cancer or Ovarian cancer.    Current Outpatient Medications   Medication Instructions    norelgestromin-ethinyl estradiol (Xulane) 150-35 MCG/24HR 1 patch, Transdermal, Weekly       Review of Systems:  Review of Systems   Constitutional: Negative.    Gastrointestinal: Negative.    Genitourinary:  Positive for vaginal discharge and vaginal pain (itching). Negative for difficulty urinating, menstrual problem and pelvic pain.   Skin: Negative.        Physical Exam:  /69 (BP Location: Left arm, Patient Position: Sitting, Cuff Size: Standard)   Pulse 86   Ht 5' 2\" (1.575 m)   Wt 66.4 kg (146 lb 6.4 oz)   LMP 2024 (Exact Date)   BMI 26.78 kg/m²   Physical Exam  Constitutional:       General: She is not in acute distress.     Appearance: She is " well-developed.   Genitourinary:      Vulva normal.      No lesions in the vagina.      Vaginal discharge (white, adherent) and erythema present.        Right Adnexa: not tender and no mass present.     Left Adnexa: not tender and no mass present.     No cervical motion tenderness or lesion.      Uterus is not tender.   Breasts:     Right: No mass, nipple discharge, skin change or tenderness.      Left: No mass, nipple discharge, skin change or tenderness.   Neck:      Thyroid: No thyromegaly.   Cardiovascular:      Rate and Rhythm: Normal rate and regular rhythm.   Pulmonary:      Effort: Pulmonary effort is normal.   Abdominal:      Palpations: Abdomen is soft.      Tenderness: There is no abdominal tenderness.   Musculoskeletal:      Cervical back: Neck supple.   Neurological:      Mental Status: She is alert and oriented to person, place, and time.   Skin:     General: Skin is warm and dry.   Vitals reviewed.       Assessment/Plan:   1. Normal well-woman GYN exam.  2. Cervical cancer screening:  Normal cervical exam.  Pap smear done with HPV reflex.  Has not received HPV vaccine in the past, but she desires to initiate vaccine series now.  Given HPV vaccine today and she will return in 2 and 6 months for subsequent vaccinations.     3. STD screening:  Orders placed for vaginal GC/CT cultures.  Orders placed for serum anti-HIV, anti-HCV, HbsAg, syphilis panel.   4. Breast cancer screening:  Normal breast exam.  Patient's mother with breast CA diagnosed last year at age 42.  Referral ordered for consultation with [facility].  Reviewed breast self-awareness.   5. Depression Screening: Patient's depression screening was assessed with a PHQ-2 score of 0. Clinically patient does not have depression. No treatment is required.   6. BMI Counseling: Body mass index is 26.78 kg/m².  No intervention indicated.   7. Contraception:  Transdermal patch.  Given Rx refills for another year.   8. Recurrent Vaginal  Candidiasis:  Given Rx Diflucan to use weekly x3 months.  Return in 4 months for follow up evaluation.   9. Return to office in 1 year for annual GYN exam.    Reviewed with patient that test results are available in MyChart immediately, but that they will not necessarily be reviewed by me immediately.  Explained that I will review results at my earliest opportunity and contact patient appropriately.

## 2024-09-25 NOTE — LETTER
10/1/2024    To Sindhu Ramirez  : 2005      This letter is to advise you that your recent PAP SMEAR results were reviewed by me and are NORMAL.  We will see you in 1 year for your annual exam.    YASMANI Layne

## 2024-09-26 LAB
HBV SURFACE AG SER QL: NORMAL
HCV AB SER QL: NORMAL
HIV 1+2 AB+HIV1 P24 AG SERPL QL IA: NORMAL
HIV 2 AB SERPL QL IA: NORMAL
HIV1 AB SERPL QL IA: NORMAL
HIV1 P24 AG SERPL QL IA: NORMAL

## 2024-09-29 LAB
C TRACH DNA SPEC QL NAA+PROBE: NEGATIVE
N GONORRHOEA DNA SPEC QL NAA+PROBE: NEGATIVE

## 2024-10-01 LAB
LAB AP GYN PRIMARY INTERPRETATION: NORMAL
Lab: NORMAL
PATH INTERP SPEC-IMP: NORMAL

## 2024-11-10 DIAGNOSIS — Z30.09 UNWANTED FERTILITY: ICD-10-CM

## 2024-11-11 RX ORDER — NORELGESTROMIN AND ETHINYL ESTRADIOL 150; 35 UG/D; UG/D
PATCH TRANSDERMAL
Qty: 9 PATCH | Refills: 4 | Status: SHIPPED | OUTPATIENT
Start: 2024-11-11

## 2024-11-25 ENCOUNTER — TELEPHONE (OUTPATIENT)
Dept: OBGYN CLINIC | Facility: CLINIC | Age: 19
End: 2024-11-25

## 2025-01-31 ENCOUNTER — TELEPHONE (OUTPATIENT)
Dept: OBGYN CLINIC | Facility: CLINIC | Age: 20
End: 2025-01-31

## 2025-03-25 ENCOUNTER — CLINICAL SUPPORT (OUTPATIENT)
Dept: OBGYN CLINIC | Facility: CLINIC | Age: 20
End: 2025-03-25

## 2025-03-25 VITALS — WEIGHT: 141.4 LBS | SYSTOLIC BLOOD PRESSURE: 100 MMHG | DIASTOLIC BLOOD PRESSURE: 72 MMHG | BODY MASS INDEX: 25.86 KG/M2

## 2025-03-25 DIAGNOSIS — Z23 NEED FOR HPV VACCINATION: Primary | ICD-10-CM

## 2025-03-25 PROCEDURE — 90471 IMMUNIZATION ADMIN: CPT

## 2025-03-25 PROCEDURE — 90651 9VHPV VACCINE 2/3 DOSE IM: CPT

## 2025-07-01 ENCOUNTER — OFFICE VISIT (OUTPATIENT)
Dept: OBGYN CLINIC | Facility: CLINIC | Age: 20
End: 2025-07-01

## 2025-07-01 VITALS — DIASTOLIC BLOOD PRESSURE: 70 MMHG | WEIGHT: 143.2 LBS | SYSTOLIC BLOOD PRESSURE: 108 MMHG | BODY MASS INDEX: 26.19 KG/M2

## 2025-07-01 DIAGNOSIS — N89.8 VAGINAL DISCHARGE: Primary | ICD-10-CM

## 2025-07-01 LAB
BV WHIFF TEST VAG QL: NEGATIVE
CLUE CELLS SPEC QL WET PREP: NO
T VAGINALIS VAG QL WET PREP: NO
YEAST VAG QL WET PREP: NO

## 2025-07-01 PROCEDURE — 87491 CHLMYD TRACH DNA AMP PROBE: CPT

## 2025-07-01 PROCEDURE — 87591 N.GONORRHOEAE DNA AMP PROB: CPT

## 2025-07-01 PROCEDURE — 87210 SMEAR WET MOUNT SALINE/INK: CPT | Performed by: OBSTETRICS & GYNECOLOGY

## 2025-07-01 PROCEDURE — 99214 OFFICE O/P EST MOD 30 MIN: CPT | Performed by: OBSTETRICS & GYNECOLOGY

## 2025-07-01 NOTE — PROGRESS NOTES
Name: Sindhu Ramirez      : 2005      MRN: [id number]  Encounter Provider: Maria Alejandra Gerber MD  Encounter Date: 2025   Encounter department: [facility]  :  Assessment & Plan  Vaginal discharge  POCT wet mount performed and negative for yeast, trich, BV.  GC/CT vaginal swab sent.   Discussed with patient likely etiology is intermenstrual bleeding given her lack of infectious symptoms. Counseled that it should resolve after        D/w Dr. Renetta Gerber MD  PGY-1     History of Present Illness   HPI  Sindhu Ramirez is a 19 y.o. female who presents with 1 week of brown/pink vaginal discharge. The discharge began after sexual intercourse without a condom. Denies odor, pruritus, vaginal irritation, dysuria. She uses the patch for contraception, which she changes weekly, removing every 4th week. She has used the patch for 3 years and she has had regular q28 day bleeding for the past three years. Bleeding is heavy with mild cramping for 6 days. She has never had intermenstrual bleeding before.       LMP: 25  Contraception: condoms, Xulane patch   STI: none, regularly tested  Pap: negative 2024  Partners: 1, male       History obtained from: patient    Review of Systems   All other systems reviewed and are negative.        Menstrual History:  OB History          0    Para   0    Term   0       0    AB   0    Living   0         SAB   0    IAB   0    Ectopic   0    Multiple   0    Live Births   0                     Objective:  /70 (Patient Position: Sitting, Cuff Size: Standard)   Wt 65 kg (143 lb 3.2 oz)   LMP 2025 (Exact Date)   BMI 26.19 kg/m²  Body mass index is 26.19 kg/m².     Physical Exam:  GEN: The patient was alert and oriented x3, pleasant well-appearing female in no acute distress.   RESP:  Unlabored breathing  : Chaperoned by Dr. Jackson. Normal appearing external female genitalia, normal appearing urethral meatus. On  speculum exam,  normal appearing vaginal epithelium, no vaginal discharge, red/brown blood present in vaginal vault, grossly normal appearing cervix.

## 2025-07-02 LAB
C TRACH DNA SPEC QL NAA+PROBE: NEGATIVE
N GONORRHOEA DNA SPEC QL NAA+PROBE: NEGATIVE

## 2025-07-03 ENCOUNTER — RESULTS FOLLOW-UP (OUTPATIENT)
Dept: OTHER | Facility: HOSPITAL | Age: 20
End: 2025-07-03

## 2025-07-03 NOTE — TELEPHONE ENCOUNTER
----- Message from Maria Alejandra Gerber MD sent at 7/3/2025  7:48 AM EDT -----  Hi,     Could you please let this patient know that her GC/CT was negative?    Thank you!  Maria Alejandra Gerber, PGY1   ----- Message -----  From: Maria Alejandra Gerber MD  Sent: 7/1/2025  12:53 PM EDT  To: Maria Alejandra Gerber MD

## 2025-07-28 ENCOUNTER — TELEPHONE (OUTPATIENT)
Dept: OBGYN CLINIC | Facility: CLINIC | Age: 20
End: 2025-07-28